# Patient Record
Sex: MALE | Race: WHITE | NOT HISPANIC OR LATINO | Employment: FULL TIME | URBAN - METROPOLITAN AREA
[De-identification: names, ages, dates, MRNs, and addresses within clinical notes are randomized per-mention and may not be internally consistent; named-entity substitution may affect disease eponyms.]

---

## 2020-06-12 ENCOUNTER — HISTORICAL (OUTPATIENT)
Dept: ADMINISTRATIVE | Facility: HOSPITAL | Age: 35
End: 2020-06-12

## 2020-06-15 LAB — FINAL CULTURE: NORMAL

## 2023-09-18 DIAGNOSIS — F41.9 ANXIETY: Primary | ICD-10-CM

## 2023-09-18 RX ORDER — SERTRALINE HYDROCHLORIDE 100 MG/1
150 TABLET, FILM COATED ORAL NIGHTLY
Qty: 90 TABLET | Refills: 0 | Status: SHIPPED | OUTPATIENT
Start: 2023-09-18 | End: 2023-10-31 | Stop reason: SDUPTHER

## 2023-09-18 RX ORDER — SERTRALINE HYDROCHLORIDE 100 MG/1
150 TABLET, FILM COATED ORAL NIGHTLY
COMMUNITY
Start: 2023-06-28 | End: 2023-09-18 | Stop reason: SDUPTHER

## 2023-10-10 DIAGNOSIS — K58.9 IRRITABLE BOWEL SYNDROME, UNSPECIFIED TYPE: Primary | ICD-10-CM

## 2023-10-10 RX ORDER — DIPHENOXYLATE HYDROCHLORIDE AND ATROPINE SULFATE 2.5; .025 MG/1; MG/1
1 TABLET ORAL 2 TIMES DAILY
COMMUNITY
End: 2023-10-10 | Stop reason: SDUPTHER

## 2023-10-11 RX ORDER — DIPHENOXYLATE HYDROCHLORIDE AND ATROPINE SULFATE 2.5; .025 MG/1; MG/1
TABLET ORAL
Qty: 30 TABLET | Refills: 0 | Status: SHIPPED | OUTPATIENT
Start: 2023-10-11

## 2023-10-11 NOTE — TELEPHONE ENCOUNTER
----- Message from Anisa Lewis sent at 10/10/2023 11:28 AM CDT -----  Regarding: MED REFILL  CAN HIS LOMITIL BE REFILLED AT [facility]

## 2023-10-30 PROBLEM — F41.9 ANXIETY: Status: ACTIVE | Noted: 2023-10-30

## 2023-10-31 ENCOUNTER — OFFICE VISIT (OUTPATIENT)
Dept: FAMILY MEDICINE | Facility: CLINIC | Age: 38
End: 2023-10-31
Payer: COMMERCIAL

## 2023-10-31 VITALS
DIASTOLIC BLOOD PRESSURE: 80 MMHG | WEIGHT: 272 LBS | RESPIRATION RATE: 16 BRPM | BODY MASS INDEX: 36.05 KG/M2 | HEIGHT: 73 IN | SYSTOLIC BLOOD PRESSURE: 130 MMHG | TEMPERATURE: 98 F | HEART RATE: 91 BPM | OXYGEN SATURATION: 97 %

## 2023-10-31 DIAGNOSIS — F32.A DEPRESSION, UNSPECIFIED DEPRESSION TYPE: ICD-10-CM

## 2023-10-31 DIAGNOSIS — E66.01 CLASS 2 SEVERE OBESITY DUE TO EXCESS CALORIES WITH SERIOUS COMORBIDITY AND BODY MASS INDEX (BMI) OF 35.0 TO 35.9 IN ADULT: ICD-10-CM

## 2023-10-31 DIAGNOSIS — F41.9 ANXIETY: ICD-10-CM

## 2023-10-31 DIAGNOSIS — E78.2 MIXED HYPERLIPIDEMIA: ICD-10-CM

## 2023-10-31 DIAGNOSIS — K58.9 IRRITABLE BOWEL SYNDROME, UNSPECIFIED TYPE: ICD-10-CM

## 2023-10-31 DIAGNOSIS — K21.9 GASTROESOPHAGEAL REFLUX DISEASE, UNSPECIFIED WHETHER ESOPHAGITIS PRESENT: ICD-10-CM

## 2023-10-31 DIAGNOSIS — E56.9 VITAMIN DEFICIENCY: ICD-10-CM

## 2023-10-31 DIAGNOSIS — I10 PRIMARY HYPERTENSION: Primary | ICD-10-CM

## 2023-10-31 DIAGNOSIS — F17.200 SMOKER: ICD-10-CM

## 2023-10-31 PROBLEM — K29.70 GASTRITIS: Status: ACTIVE | Noted: 2023-10-31

## 2023-10-31 PROBLEM — G47.00 INSOMNIA: Status: ACTIVE | Noted: 2023-10-31

## 2023-10-31 PROBLEM — K31.1 PYLORIC STENOSIS: Status: ACTIVE | Noted: 2023-10-31

## 2023-10-31 PROBLEM — G47.33 OSA (OBSTRUCTIVE SLEEP APNEA): Status: ACTIVE | Noted: 2023-10-31

## 2023-10-31 PROBLEM — F42.9 OCD (OBSESSIVE COMPULSIVE DISORDER): Status: ACTIVE | Noted: 2023-10-31

## 2023-10-31 PROBLEM — E78.5 ELEVATED LIPIDS: Status: ACTIVE | Noted: 2023-10-31

## 2023-10-31 PROBLEM — E66.9 OBESE: Status: ACTIVE | Noted: 2023-10-31

## 2023-10-31 PROBLEM — S03.00XA TMJ (DISLOCATION OF TEMPOROMANDIBULAR JOINT): Status: ACTIVE | Noted: 2023-10-31

## 2023-10-31 PROBLEM — E55.9 VITAMIN D DEFICIENCY: Status: ACTIVE | Noted: 2023-10-31

## 2023-10-31 PROCEDURE — 99214 OFFICE O/P EST MOD 30 MIN: CPT | Mod: ,,, | Performed by: FAMILY MEDICINE

## 2023-10-31 PROCEDURE — 1159F PR MEDICATION LIST DOCUMENTED IN MEDICAL RECORD: ICD-10-PCS | Mod: CPTII,,, | Performed by: FAMILY MEDICINE

## 2023-10-31 PROCEDURE — 4010F ACE/ARB THERAPY RXD/TAKEN: CPT | Mod: CPTII,,, | Performed by: FAMILY MEDICINE

## 2023-10-31 PROCEDURE — 3075F PR MOST RECENT SYSTOLIC BLOOD PRESS GE 130-139MM HG: ICD-10-PCS | Mod: CPTII,,, | Performed by: FAMILY MEDICINE

## 2023-10-31 PROCEDURE — 3079F DIAST BP 80-89 MM HG: CPT | Mod: CPTII,,, | Performed by: FAMILY MEDICINE

## 2023-10-31 PROCEDURE — 1160F RVW MEDS BY RX/DR IN RCRD: CPT | Mod: CPTII,,, | Performed by: FAMILY MEDICINE

## 2023-10-31 PROCEDURE — 3075F SYST BP GE 130 - 139MM HG: CPT | Mod: CPTII,,, | Performed by: FAMILY MEDICINE

## 2023-10-31 PROCEDURE — 3008F BODY MASS INDEX DOCD: CPT | Mod: CPTII,,, | Performed by: FAMILY MEDICINE

## 2023-10-31 PROCEDURE — 4010F PR ACE/ARB THEARPY RXD/TAKEN: ICD-10-PCS | Mod: CPTII,,, | Performed by: FAMILY MEDICINE

## 2023-10-31 PROCEDURE — 1159F MED LIST DOCD IN RCRD: CPT | Mod: CPTII,,, | Performed by: FAMILY MEDICINE

## 2023-10-31 PROCEDURE — 3079F PR MOST RECENT DIASTOLIC BLOOD PRESSURE 80-89 MM HG: ICD-10-PCS | Mod: CPTII,,, | Performed by: FAMILY MEDICINE

## 2023-10-31 PROCEDURE — 99214 PR OFFICE/OUTPT VISIT, EST, LEVL IV, 30-39 MIN: ICD-10-PCS | Mod: ,,, | Performed by: FAMILY MEDICINE

## 2023-10-31 PROCEDURE — 3008F PR BODY MASS INDEX (BMI) DOCUMENTED: ICD-10-PCS | Mod: CPTII,,, | Performed by: FAMILY MEDICINE

## 2023-10-31 PROCEDURE — 1160F PR REVIEW ALL MEDS BY PRESCRIBER/CLIN PHARMACIST DOCUMENTED: ICD-10-PCS | Mod: CPTII,,, | Performed by: FAMILY MEDICINE

## 2023-10-31 RX ORDER — CALC/MAG/B COMPLEX/D3/HERB 61
15 TABLET ORAL DAILY
COMMUNITY
End: 2023-10-31 | Stop reason: ALTCHOICE

## 2023-10-31 RX ORDER — PRAVASTATIN SODIUM 40 MG/1
40 TABLET ORAL NIGHTLY
COMMUNITY
Start: 2023-10-25

## 2023-10-31 RX ORDER — LOPERAMIDE HYDROCHLORIDE 2 MG/1
2 CAPSULE ORAL DAILY
COMMUNITY

## 2023-10-31 RX ORDER — SERTRALINE HYDROCHLORIDE 100 MG/1
150 TABLET, FILM COATED ORAL NIGHTLY
Qty: 135 TABLET | Refills: 3 | Status: SHIPPED | OUTPATIENT
Start: 2023-10-31

## 2023-10-31 RX ORDER — CHOLECALCIFEROL (VITAMIN D3) 25 MCG
1000 TABLET ORAL DAILY
COMMUNITY
End: 2023-10-31 | Stop reason: SDUPTHER

## 2023-10-31 RX ORDER — DEXLANSOPRAZOLE 60 MG/1
60 CAPSULE, DELAYED RELEASE ORAL DAILY
Qty: 90 CAPSULE | Refills: 3 | Status: SHIPPED | OUTPATIENT
Start: 2023-10-31

## 2023-10-31 RX ORDER — BENAZEPRIL HYDROCHLORIDE 10 MG/1
10 TABLET ORAL NIGHTLY
Qty: 90 TABLET | Refills: 3 | Status: SHIPPED | OUTPATIENT
Start: 2023-10-31

## 2023-10-31 RX ORDER — HYDROGEN PEROXIDE 3 %
20 SOLUTION, NON-ORAL MISCELLANEOUS DAILY
COMMUNITY
End: 2023-10-31 | Stop reason: ALTCHOICE

## 2023-10-31 RX ORDER — DILTIAZEM HYDROCHLORIDE 180 MG/1
180 CAPSULE, COATED, EXTENDED RELEASE ORAL EVERY MORNING
COMMUNITY
Start: 2023-10-27

## 2023-10-31 RX ORDER — BENAZEPRIL HYDROCHLORIDE 10 MG/1
10 TABLET ORAL NIGHTLY
COMMUNITY
Start: 2023-10-27 | End: 2023-10-31 | Stop reason: SDUPTHER

## 2023-10-31 RX ORDER — CHOLECALCIFEROL (VITAMIN D3) 25 MCG
1000 TABLET ORAL 2 TIMES DAILY
Start: 2023-10-31

## 2023-10-31 RX ORDER — CHOLESTYRAMINE 4 G/9G
4 POWDER, FOR SUSPENSION ORAL
Qty: 270 PACKET | Refills: 3 | Status: SHIPPED | OUTPATIENT
Start: 2023-10-31

## 2023-10-31 NOTE — PROGRESS NOTES
Patient Name: Buddy Hassan Jr.     Patient ID: [id number]     Chief Complaint: Results (Go over lab results.)      HPI:     Buddy Hassan Jr. is a 38 y.o. male here today for follow up for Hypertension, IBS, Depression, Anxiety, GERD, Vitamin d deficiency, and lab work results. Reviewed and discussed lab work results.    Past Medical History:   Diagnosis Date    Depression     Elevated lipids     Gastritis     GERD (gastroesophageal reflux disease)     HTN (hypertension)     Insomnia     Irritable bowel syndrome     Obese     OCD (obsessive compulsive disorder)     CATY (obstructive sleep apnea)     Pyloric stenosis     Smoker     TMJ (dislocation of temporomandibular joint)     Vitamin deficiency         History reviewed. No pertinent surgical history.     Social History     Socioeconomic History    Marital status: Single    Number of children: 1   Tobacco Use    Smoking status: Every Day     Current packs/day: 1.00     Average packs/day: 1 pack/day for 26.8 years (26.8 ttl pk-yrs)     Types: Cigarettes     Start date: 1997    Smokeless tobacco: Never   Substance and Sexual Activity    Alcohol use: Not Currently    Drug use: Never    Sexual activity: Yes     Partners: Female        Current Outpatient Medications   Medication Instructions    benazepriL (LOTENSIN) 10 mg, Oral, Nightly    cholestyramine (QUESTRAN) 4 gram packet 4 g, Oral, 3 times daily with meals    dexlansoprazole (DEXILANT) 60 mg, Oral, Daily    diltiaZEM (CARDIZEM CD) 180 mg, Oral, Every morning    diphenoxylate-atropine 2.5-0.025 mg (LOMOTIL) 2.5-0.025 mg per tablet 1 TABLET BY MOUTH BID PRN DIARRHEA    loperamide (IMODIUM) 2 mg, Oral, Daily    pravastatin (PRAVACHOL) 40 mg, Oral, Nightly    sertraline (ZOLOFT) 150 mg, Oral, Nightly    vitamin D (VITAMIN D3) 1,000 Units, Oral, 2 times daily       Review of patient's allergies indicates:   Allergen Reactions    Pravachol [pravastatin] Diarrhea          There is no immunization history on file for this  "patient.    Patient Care Team:  Farhat Wilhelm Sr., MD as PCP - General (Family Medicine)  Yulia Crockett MD as Consulting Physician (Cardiology)     Subjective:     Review of Systems    10 point review of systems conducted, negative except as stated in the history of present illness. See HPI for details.    Objective:     Visit Vitals  /80 (BP Location: Left arm, Patient Position: Sitting, BP Method: Medium (Manual))   Pulse 91   Temp 98 °F (36.7 °C)   Resp 16   Ht 6' 1" (1.854 m)   Wt 123.4 kg (272 lb)   SpO2 97%   BMI 35.89 kg/m²       Physical Exam  Constitutional:       Appearance: He is obese.   HENT:      Head: Normocephalic and atraumatic.   Cardiovascular:      Rate and Rhythm: Normal rate and regular rhythm.   Pulmonary:      Effort: Pulmonary effort is normal.      Breath sounds: Normal breath sounds.   Abdominal:      Palpations: Abdomen is soft.      Tenderness: There is no abdominal tenderness.   Musculoskeletal:         General: No swelling or tenderness. Normal range of motion.      Cervical back: Normal range of motion and neck supple.      Right lower leg: No edema.      Left lower leg: No edema.   Lymphadenopathy:      Cervical: No cervical adenopathy.   Skin:     General: Skin is warm and dry.   Neurological:      General: No focal deficit present.      Mental Status: He is alert and oriented to person, place, and time.   Psychiatric:         Mood and Affect: Mood normal.           Assessment:       ICD-10-CM ICD-9-CM   1. Primary hypertension  I10 401.9   2. Mixed hyperlipidemia  E78.2 272.2   3. Irritable bowel syndrome, unspecified type  K58.9 564.1   4. Gastroesophageal reflux disease, unspecified whether esophagitis present  K21.9 530.81   5. Depression, unspecified depression type  F32.A 311   6. Anxiety  F41.9 300.00   7. Class 2 severe obesity due to excess calories with serious comorbidity and body mass index (BMI) of 35.0 to 35.9 in adult  E66.01 278.01    Z68.35 V85.35 "   8. Vitamin D deficiency  E56.9 269.2   9. Smoker  F17.200 305.1        Plan:     1. Primary hypertension  Overview:  Continue with Benazepril 10 mg nightly.  Presently on Cardizem  mg daily as prescribed per Cardiology.  Followed by Cardiology.  Low Sodium Diet (DASH Diet - Less than 2 grams of sodium per day).  Monitor blood pressure daily and log. Report consistent numbers greater than 140/90.  Maintain healthy weight with goal BMI <30. Exercise 30 minutes per day, 5 days per week.    Assessment & Plan:  Patient's BP today is 130/80.  Patient is at goal.    Orders:  -     benazepriL (LOTENSIN) 10 MG tablet; Take 1 tablet (10 mg total) by mouth every evening.  Dispense: 90 tablet; Refill: 3    2. Mixed hyperlipidemia  Overview:  Patient presently on Pravachol 40 mg daily.  Patient is followed by Cardiology.    Stressed importance of dietary modifications. Follow a low cholesterol, low saturated fat diet with less that 200mg of cholesterol a day.  Avoid fried foods and high saturated fats (high saturated fats less than 7% of calories).  Add Flax Seed/Fish Oil supplements to diet. Increase dietary fiber.  Regular exercise can reduce LDL and raise HDL. Stressed importance of physical activity 5 times per week for 30 minutes per day.     Assessment & Plan:  Patient's last LDL was 115 on 10/24/2023.  Patient's cholesterol is not well controlled and not at goal.  He was instructed to follow-up with Cardiology as he is managing patient's cholesterol.      3. Irritable bowel syndrome, unspecified type  Overview:  Continue with Imodium 2 mg daily PRN diarrhea and occasional Lomotil 2.5-0.025 mg PRN diarrhea.    Assessment & Plan:  Patient is not well controlled.  Will start Questran Powder 4 Gram take 1 packet by mouth TID.  Instructed patient to follow up with GI for Colonoscopy.    Orders:  -     cholestyramine (QUESTRAN) 4 gram packet; Take 1 packet (4 g total) by mouth 3 (three) times daily with meals.   Dispense: 270 packet; Refill: 3    4. Gastroesophageal reflux disease, unspecified whether esophagitis present  Overview:  Avoid spicy, acidic, fried foods and alcohol.  Eat 2-3 hours before going to bed.  Avoid tight clothing, chew food thoroughly.  Reduce caffeine intake, avoid soda.    Assessment & Plan:  Patient is not well controlled.  Will start Dexilant 60 mg daily AC.    Orders:  -     dexlansoprazole (DEXILANT) 60 mg capsule; Take 1 capsule (60 mg total) by mouth once daily.  Dispense: 90 capsule; Refill: 3    5. Depression, unspecified depression type  Overview:  Continue with Zoloft 100 mg 1 1/2 tabs nightly.  Continue present med tx.  Educated patient on the risks of serotonin based medications such as serotonin modulators and SSRIs/SNRIs including common side effects of nausea, GI upset, headache dizziness as well as the rare risk for worsening symptoms of depression including development of suicidal thoughts or ideations, and serotonin syndrome.   Discussed benefits of medication not becoming noticeable until up to 6 weeks from start date.   Exercise daily. Get sunlight daily.  Practice positive phrases and repeat throughout the day, along with yoga and relaxation techniques.  Establish good social support, make changes to reduce stress.  Reports any symptoms of suicidal/homicidal ideations or self harm immediately, if clinic is closed go to nearest emergency room.    Assessment & Plan:  Patient is well controlled.    Orders:  -     sertraline (ZOLOFT) 100 MG tablet; Take 1.5 tablets (150 mg total) by mouth every evening.  Dispense: 135 tablet; Refill: 3    6. Anxiety  Overview:  Continue with Zoloft 100 mg 1 1/2 tabs nightly.  Practice deep breathing or abdominal breathing exercises when anxiety occurs.  Exercise daily. Get sunlight daily.  Avoid caffeine, alcohol and stimulants.  Practice positive phrases and repeat throughout the day, along with yoga and relaxation techniques.  Set healthy  boundaries, avoid people and conversations that increase stress.  Educated patient on the risks of serotonin based medications such as serotonin modulators and SSRIs/SNRIs including common side effects of nausea, GI upset, headache dizziness as well as the rare risk for worsening symptoms of depression including development of suicidal thoughts or ideations, and serotonin syndrome.   Discussed benefits of medication not becoming noticeable until up to 6 weeks from start date.   Reports any symptoms of suicidal or homicidal ideations immediately, if clinic is closed go to nearest emergency room.  Discussed possibility of using benzodiazepines    Assessment & Plan:  Patient is well controlled.      7. Class 2 severe obesity due to excess calories with serious comorbidity and body mass index (BMI) of 35.0 to 35.9 in adult  Overview:  Body mass index is 35.89 kg/m².  Goal BMI <30.  Exercise 5 times a week for 30 minutes per day.  Avoid soda, simple sugars, excessive rice, potatoes or bread. Limit fast foods and fried foods.  Choose complex carbs in moderation (example: green vegetables, beans, oatmeal). Eat plenty of fresh fruits and vegetables with lean meats daily.  Do not skip meals. Eat a balanced portion size.  Avoid fad diets. Consider permanent healthy life style changes.       8. Vitamin D deficiency  Overview:  Patient's present vitamin-D dose is 1000 units daily.    Assessment & Plan:  Most recent Vitamin D 24.7 ng/mL on 10/24/2023.  Will increase OTC Vitamin D3 to 1,000 units BID.  Patient is not at goal.    Orders:  -     vitamin D (VITAMIN D3) 1000 units Tab; Take 1 tablet (1,000 Units total) by mouth 2 (two) times a day.    9. Smoker  Overview:  Patient was offered smoking cessation techniques such as nicotine gum or patches.  They were also offered a more regimented smoking cessation program.  They were advised to decrease the number of cigarettes by 1 every few days until they completely stopped.  It was  strongly recommended that they set a quit date and stick to it.  And finally, important health reasons to stop smoking were given to the patient. Approximately 5 minutes was spent discussing smoking cessation.    Assessment & Plan:  Patient is smoking approximately 1 pack of cigarettes a day.          [x] Discussed lab findings with the patient.  [x] Discussed diet, exercise and if appropriate, weight loss.  [x] Instructions and information, including risks and benefits of prescribed medication(s) have been reviewed with the patient and patient verbalizes understanding. Questions have been answered to the patient's satisfaction.  [x] Appropriate counseling has been given regarding anxiety and depressive issues that were discussed today.  [] Any lab drawn today will be reviewed by physician at the time it is received and appropriate recommendations bill be made and discussed with patient.     Follow up in about 6 months (around 4/30/2024) for Follow Up.   In addition to their scheduled follow up, the patient has also been instructed to follow up on as needed basis.     Future Appointments   Date Time Provider Department Center   5/1/2024  7:20 AM [facility]   5/7/2024  1:45 PM Farhat Wilhelm Sr., MD [facility] Jb Bourgeois Sr, MD

## 2023-10-31 NOTE — ASSESSMENT & PLAN NOTE
Most recent Vitamin D 24.7 ng/mL on 10/24/2023.  Will increase OTC Vitamin D3 to 1,000 units BID.  Patient is not at goal.

## 2023-10-31 NOTE — ASSESSMENT & PLAN NOTE
Patient is not well controlled.  Will start Questran Powder 4 Gram take 1 packet by mouth TID.  Instructed patient to follow up with GI for Colonoscopy.

## 2023-10-31 NOTE — ASSESSMENT & PLAN NOTE
Patient's last LDL was 115 on 10/24/2023.  Patient's cholesterol is not well controlled and not at goal.  He was instructed to follow-up with Cardiology as he is managing patient's cholesterol.

## 2025-01-07 ENCOUNTER — TELEPHONE (OUTPATIENT)
Dept: FAMILY MEDICINE | Facility: CLINIC | Age: 40
End: 2025-01-07
Payer: COMMERCIAL

## 2025-01-10 NOTE — TELEPHONE ENCOUNTER
Called and spoke with patient he is scheduled for lab on 1/13/2025 and Result appointment on 1/16/2025. Patient verbalized an understanding.

## 2025-01-15 PROBLEM — E66.812 CLASS 2 SEVERE OBESITY DUE TO EXCESS CALORIES WITH SERIOUS COMORBIDITY AND BODY MASS INDEX (BMI) OF 35.0 TO 35.9 IN ADULT: Status: ACTIVE | Noted: 2023-10-31

## 2025-01-15 PROBLEM — E66.01 CLASS 2 SEVERE OBESITY DUE TO EXCESS CALORIES WITH SERIOUS COMORBIDITY AND BODY MASS INDEX (BMI) OF 35.0 TO 35.9 IN ADULT: Status: ACTIVE | Noted: 2023-10-31

## 2025-01-15 PROBLEM — R73.03 PREDIABETES: Status: ACTIVE | Noted: 2025-01-15

## 2025-01-16 ENCOUNTER — TELEPHONE (OUTPATIENT)
Dept: FAMILY MEDICINE | Facility: CLINIC | Age: 40
End: 2025-01-16

## 2025-01-16 ENCOUNTER — OFFICE VISIT (OUTPATIENT)
Dept: FAMILY MEDICINE | Facility: CLINIC | Age: 40
End: 2025-01-16
Payer: COMMERCIAL

## 2025-01-16 VITALS
SYSTOLIC BLOOD PRESSURE: 134 MMHG | WEIGHT: 279 LBS | RESPIRATION RATE: 20 BRPM | BODY MASS INDEX: 36.98 KG/M2 | HEART RATE: 81 BPM | TEMPERATURE: 98 F | OXYGEN SATURATION: 98 % | HEIGHT: 73 IN | DIASTOLIC BLOOD PRESSURE: 82 MMHG

## 2025-01-16 DIAGNOSIS — F41.9 ANXIETY: ICD-10-CM

## 2025-01-16 DIAGNOSIS — F42.2 MIXED OBSESSIONAL THOUGHTS AND ACTS: ICD-10-CM

## 2025-01-16 DIAGNOSIS — E66.812 CLASS 2 SEVERE OBESITY DUE TO EXCESS CALORIES WITH SERIOUS COMORBIDITY AND BODY MASS INDEX (BMI) OF 35.0 TO 35.9 IN ADULT: ICD-10-CM

## 2025-01-16 DIAGNOSIS — E78.2 MIXED HYPERLIPIDEMIA: ICD-10-CM

## 2025-01-16 DIAGNOSIS — I10 PRIMARY HYPERTENSION: ICD-10-CM

## 2025-01-16 DIAGNOSIS — R73.03 PREDIABETES: ICD-10-CM

## 2025-01-16 DIAGNOSIS — F32.A DEPRESSION, UNSPECIFIED DEPRESSION TYPE: ICD-10-CM

## 2025-01-16 DIAGNOSIS — E55.9 VITAMIN D DEFICIENCY: Primary | ICD-10-CM

## 2025-01-16 DIAGNOSIS — K58.0 IRRITABLE BOWEL SYNDROME WITH DIARRHEA: ICD-10-CM

## 2025-01-16 DIAGNOSIS — Z53.20 COLONOSCOPY REFUSED: ICD-10-CM

## 2025-01-16 DIAGNOSIS — E66.01 CLASS 2 SEVERE OBESITY DUE TO EXCESS CALORIES WITH SERIOUS COMORBIDITY AND BODY MASS INDEX (BMI) OF 35.0 TO 35.9 IN ADULT: ICD-10-CM

## 2025-01-16 PROCEDURE — 99214 OFFICE O/P EST MOD 30 MIN: CPT | Mod: ,,,

## 2025-01-16 PROCEDURE — 1160F RVW MEDS BY RX/DR IN RCRD: CPT | Mod: CPTII,,,

## 2025-01-16 PROCEDURE — 3008F BODY MASS INDEX DOCD: CPT | Mod: CPTII,,,

## 2025-01-16 PROCEDURE — 3044F HG A1C LEVEL LT 7.0%: CPT | Mod: CPTII,,,

## 2025-01-16 PROCEDURE — 4010F ACE/ARB THERAPY RXD/TAKEN: CPT | Mod: CPTII,,,

## 2025-01-16 PROCEDURE — 1159F MED LIST DOCD IN RCRD: CPT | Mod: CPTII,,,

## 2025-01-16 PROCEDURE — 3079F DIAST BP 80-89 MM HG: CPT | Mod: CPTII,,,

## 2025-01-16 PROCEDURE — 3075F SYST BP GE 130 - 139MM HG: CPT | Mod: CPTII,,,

## 2025-01-16 RX ORDER — CALC/MAG/B COMPLEX/D3/HERB 61
15 TABLET ORAL DAILY
COMMUNITY

## 2025-01-16 RX ORDER — EZETIMIBE 10 MG/1
10 TABLET ORAL DAILY
COMMUNITY

## 2025-01-16 RX ORDER — SERTRALINE HYDROCHLORIDE 100 MG/1
150 TABLET, FILM COATED ORAL NIGHTLY
Qty: 135 TABLET | Refills: 1 | Status: SHIPPED | OUTPATIENT
Start: 2025-01-16 | End: 2025-07-15

## 2025-01-16 RX ORDER — ESOMEPRAZOLE MAGNESIUM 20 MG/1
20 GRANULE, DELAYED RELEASE ORAL
COMMUNITY

## 2025-01-16 NOTE — ASSESSMENT & PLAN NOTE
LDL 85  today; at goal of < 100.    Continue Pravachol 40 mg and Zetia 10 mg daily.  Continue following Cardiology.  Repeat in 6 months.

## 2025-01-16 NOTE — ASSESSMENT & PLAN NOTE
"Reports only taking two Imodium 2 mg daily; and taking Lomotil 2.5-0.025 mg PRN for diarrhea.    Pt reports he self-discontinued Questran Powder 4 grams b/c he "just did not want to take more medications".    Pt reports not following up with GI for colonoscopy, since he feels every time he has a colonoscopy, the prep for the procedure adversely affects his GI tract, and it takes 4-6 mos to restore his normal BMs. He reports having tried several different preps for each of his colonoscopies, which he estimates as being 3 or 4 in number.    Reports last colonoscopy was approx 10 yrs ago; reports last colonoscopy was abnormal due to polyp findings, and was instructed to return in 3 years, but has failed to do so, because he is unwilling to undergo these procedures due to his disruption to his normal Bms.  "

## 2025-01-16 NOTE — ASSESSMENT & PLAN NOTE
Vitamin D 30.2 ng/mL; not at goal according to [employer] guidelines of Vit D being 40 - 60.    Pt report having suffering with IBS, and reports taking 1000 Vit D3 once a day, since taking BID causes diarrhea IBS symptoms to worsen    Continue taking 1000 Vit D3 once a day.     Repeat Vit D 3 mos.

## 2025-01-16 NOTE — PROGRESS NOTES
Patient ID: [id number]     Chief Complaint: Follow-up (Lab results ) and Medication Refill    HPI:     Buddy Hassan Jr. is a 39 y.o. male here today for lab results and refill of his Zoloft. Labs reviewed. No other complaints today.     Past Medical History:   Diagnosis Date    Depression     Elevated lipids     Gastritis     GERD (gastroesophageal reflux disease)     HTN (hypertension)     Insomnia     Irritable bowel syndrome     Obese     OCD (obsessive compulsive disorder)     CATY (obstructive sleep apnea)     Pyloric stenosis     Smoker     TMJ (dislocation of temporomandibular joint)     Vitamin deficiency         History reviewed. No pertinent surgical history.     Social History     Socioeconomic History    Marital status: Single    Number of children: 1   Tobacco Use    Smoking status: Every Day     Current packs/day: 1.00     Average packs/day: 1 pack/day for 28.0 years (28.0 ttl pk-yrs)     Types: Cigarettes     Start date: 1997    Smokeless tobacco: Never   Substance and Sexual Activity    Alcohol use: Not Currently    Drug use: Never    Sexual activity: Yes     Partners: Female     Social Drivers of Health     Financial Resource Strain: Low Risk  (1/12/2025)    Overall Financial Resource Strain (CARDIA)     Difficulty of Paying Living Expenses: Not very hard   Food Insecurity: No Food Insecurity (1/12/2025)    Hunger Vital Sign     Worried About Running Out of Food in the Last Year: Never true     Ran Out of Food in the Last Year: Never true   Transportation Needs: No Transportation Needs (1/16/2025)    TRANSPORTATION NEEDS     Transportation : No   Physical Activity: Inactive (1/12/2025)    Exercise Vital Sign     Days of Exercise per Week: 0 days     Minutes of Exercise per Session: 0 min   Stress: Stress Concern Present (1/12/2025)    [employer] [place] [employer] - Occupational Stress Questionnaire     Feeling of Stress : To some extent   Housing Stability: Low Risk  (1/16/2025)    Housing  "Stability Vital Sign     Unable to Pay for Housing in the Last Year: No     Homeless in the Last Year: No        Current Outpatient Medications   Medication Instructions    benazepriL (LOTENSIN) 10 mg, Oral, Nightly    cholestyramine (QUESTRAN) 4 gram packet 4 g, Oral, 3 times daily with meals    dexlansoprazole (DEXILANT) 60 mg, Oral, Daily    diltiaZEM (CARDIZEM CD) 180 mg, Every morning    diphenoxylate-atropine 2.5-0.025 mg (LOMOTIL) 2.5-0.025 mg per tablet 1 TABLET BY MOUTH BID PRN DIARRHEA    esomeprazole (NEXIUM) 20 mg, Before breakfast    ezetimibe (ZETIA) 10 mg, Daily    lansoprazole (PREVACID) 15 mg, Daily    loperamide (IMODIUM) 2 mg, Daily    pravastatin (PRAVACHOL) 40 mg, Nightly    sertraline (ZOLOFT) 150 mg, Oral, Nightly    vitamin D (VITAMIN D3) 1,000 Units, Oral, 2 times daily       Review of patient's allergies indicates:   Allergen Reactions    Pravachol [pravastatin] Diarrhea        Patient Care Team:  Farhat Wilhelm Sr., MD as PCP - General (Family Medicine)  Yluia Crockett MD as Consulting Physician (Cardiology)     Subjective:     12 point review of systems conducted, negative except as stated in the history of present illness. See HPI for details.    Objective:     Visit Vitals  /82 (BP Location: Left arm, Patient Position: Sitting)   Pulse 81   Temp 98.4 °F (36.9 °C)   Resp 20   Ht 6' 1" (1.854 m)   Wt 126.6 kg (279 lb)   SpO2 98%   BMI 36.81 kg/m²     Physical Exam  Vitals and nursing note reviewed.   Constitutional:       General: He is not in acute distress.     Appearance: He is obese. He is not ill-appearing.   HENT:      Head: Normocephalic and atraumatic.      Mouth/Throat:      Mouth: Mucous membranes are moist.      Pharynx: Oropharynx is clear.   Eyes:      General: No scleral icterus.     Extraocular Movements: Extraocular movements intact.      Conjunctiva/sclera: Conjunctivae normal.      Pupils: Pupils are equal, round, and reactive to light.   Neck:      " Vascular: No carotid bruit.   Cardiovascular:      Rate and Rhythm: Normal rate and regular rhythm.      Heart sounds: No murmur heard.     No friction rub. No gallop.   Pulmonary:      Effort: Pulmonary effort is normal. No respiratory distress.      Breath sounds: Normal breath sounds. No wheezing, rhonchi or rales.   Musculoskeletal:         General: Normal range of motion.      Cervical back: Normal range of motion and neck supple.   Skin:     General: Skin is warm and dry.   Neurological:      General: No focal deficit present.      Mental Status: He is alert.   Psychiatric:         Mood and Affect: Mood normal.       Labs Reviewed:     Chemistry:  Lab Results   Component Value Date     01/13/2025    K 4.3 01/13/2025    BUN 17 01/13/2025    CREATININE 0.98 01/13/2025    EGFRNORACEVR 99 01/13/2025    CALCIUM 9.6 01/13/2025    ALKPHOS 75 11/11/2004    ALBUMIN 4.5 01/13/2025    BILIDIR <0.20 10/24/2023    AST 24 01/13/2025    ALT 40 01/13/2025    [id number] 30.2 01/13/2025    TSH 1.490 01/13/2025        Lab Results   Component Value Date    HGBA1C 5.8 (H) 01/13/2025        Hematology:  Lab Results   Component Value Date    WBC 7.8 01/13/2025    HGB 14.7 01/13/2025    HCT 44.7 01/13/2025     01/13/2025       Lipid Panel:  Lab Results   Component Value Date    CHOL 140 01/13/2025    HDL 38 (L) 01/13/2025    TRIG 87 01/13/2025     Assessment:       ICD-10-CM ICD-9-CM   1. Vitamin D deficiency  E55.9 268.9   2. Class 2 severe obesity due to excess calories with serious comorbidity and body mass index (BMI) of 35.0 to 35.9 in adult  E66.812 278.01    E66.01 V85.35    Z68.35    3. Prediabetes  R73.03 790.29   4. Mixed hyperlipidemia  E78.2 272.2   5. Depression, unspecified depression type  F32.A 311   6. Primary hypertension  I10 401.9   7. Irritable bowel syndrome with diarrhea  K58.0 564.1   8. Mixed obsessional thoughts and acts  F42.2 300.3   9. Colonoscopy refused  Z53.20 V64.2   10. Anxiety  F41.9  300.00     Plan:   1. Vitamin D deficiency  Overview:  Patient's present vitamin-D dose is 1000 units daily.  10/2023 - increased 1000 IU Vit D3 dosing from daily to BID.    Assessment & Plan:  Vitamin D 30.2 ng/mL; not at goal according to Endocrine Society guidelines of Vit D being 40 - 60.    Pt report having suffering with IBS, and reports taking 1000 Vit D3 once a day, since taking BID causes diarrhea IBS symptoms to worsen    Continue taking 1000 Vit D3 once a day.     Repeat Vit D 3 mos.    Orders:  -     Vitamin D; Future; Expected date: 07/16/2025  -     Comprehensive Metabolic Panel; Future; Expected date: 07/16/2025    2. Class 2 severe obesity due to excess calories with serious comorbidity and body mass index (BMI) of 35.0 to 35.9 in adult  Overview:  Body mass index is 35.89 kg/m².  Goal BMI <30.  Exercise 5 times a week for 30 minutes per day.  Avoid soda, simple sugars, excessive rice, potatoes or bread. Limit fast foods and fried foods.  Choose complex carbs in moderation (example: green vegetables, beans, oatmeal). Eat plenty of fresh fruits and vegetables with lean meats daily.  Do not skip meals. Eat a balanced portion size.  Avoid fad diets. Consider permanent healthy life style changes.     Assessment & Plan:  Discussed referral to [facility].  Pt declined; prefers to work on diet and exercise.  Will continue to monitor.      3. Prediabetes  Overview:  Background:  A1c: 5.8 today.  No previous prediabetes or diabetes diagnoses.  No current or previous antihyperglycemic medications.    Assessment & Plan:  Plan:  Discussed options: Lifestyle mods +/- metformin  Declined metformin  Repeat A1c in 6 months    Recommend lifestyle modifications:   ? Diet:   - Adopt a mediterranean diet, with an emphasis on vegetables, fruits (limited tropical fruits), fish, poultry, non-tropical vegetable oils, nuts; minimize red meat.  - Significantly reduce consumption of bread, pasta, rice, potatoes,  sweets, and sugary drinks  - Reduce saturated fat; eliminate fried foods and trans fats; choose grilled over fried food  - Reduce ultra processed food.  If it comes in a box or wrapper it is likely processed.  - Limit your portion sizes  ? Exercise:  - Regular aerobic activity (at least 150 minutes/week of moderate-intensity activity, 75 minutes/week of vigorous intensity activity, or an equivalent combination of both)  - Resistance exercises (weights, bands, body-weight) at least two times per week    Orders:  -     Hemoglobin A1C; Future; Expected date: 07/16/2025  -     Comprehensive Metabolic Panel; Future; Expected date: 07/16/2025    4. Mixed hyperlipidemia  Overview:  Patient presently on Pravachol 40 mg daily.  Patient is followed by Cardiology.    Stressed importance of dietary modifications. Follow a low cholesterol, low saturated fat diet with less that 200mg of cholesterol a day.  Avoid fried foods and high saturated fats (high saturated fats less than 7% of calories).  Add Flax Seed/Fish Oil supplements to diet. Increase dietary fiber.  Regular exercise can reduce LDL and raise HDL. Stressed importance of physical activity 5 times per week for 30 minutes per day.     Assessment & Plan:  LDL 85  today; at goal of < 100.    Continue Pravachol 40 mg and Zetia 10 mg daily.  Continue following Cardiology.  Repeat in 6 months.     Orders:  -     Lipid Panel; Future; Expected date: 07/16/2025    5. Depression, unspecified depression type  Overview:  Continue with Zoloft 100 mg 1 1/2 tabs nightly.  Continue present med tx.  Educated patient on the risks of serotonin based medications such as serotonin modulators and SSRIs/SNRIs including common side effects of nausea, GI upset, headache dizziness as well as the rare risk for worsening symptoms of depression including development of suicidal thoughts or ideations, and serotonin syndrome.   Discussed benefits of medication not becoming noticeable until up to 6  "weeks from start date.   Exercise daily. Get sunlight daily.  Practice positive phrases and repeat throughout the day, along with yoga and relaxation techniques.  Establish good social support, make changes to reduce stress.  Reports any symptoms of suicidal/homicidal ideations or self harm immediately, if clinic is closed go to nearest emergency room.    Assessment & Plan:  Patient is well controlled.  Denies SI/HI.  Continue with Zoloft 100 mg 1 1/2 tabs nightly.    Orders:  -     sertraline (ZOLOFT) 100 MG tablet; Take 1.5 tablets (150 mg total) by mouth every evening.  Dispense: 135 tablet; Refill: 1    6. Primary hypertension  Overview:  Continue with Benazepril 10 mg nightly.  Presently on Cardizem  mg daily as prescribed per Cardiology.  Followed by Cardiology.  Low Sodium Diet (DASH Diet - Less than 2 grams of sodium per day).  Monitor blood pressure daily and log. Report consistent numbers greater than 140/90.  Maintain healthy weight with goal BMI <30. Exercise 30 minutes per day, 5 days per week.    Assessment & Plan:  Well controlled; BP today is 134/82.  Continue current medications.       7. Irritable bowel syndrome with diarrhea  Overview:  Continue with Imodium 2 mg daily PRN diarrhea and occasional Lomotil 2.5-0.025 mg PRN diarrhea.    Assessment & Plan:  Reports only taking two Imodium 2 mg daily; and taking Lomotil 2.5-0.025 mg PRN for diarrhea.    Pt reports he self-discontinued Questran Powder 4 grams b/c he "just did not want to take more medications".    Pt reports not following up with GI for colonoscopy, since he feels every time he has a colonoscopy, the prep for the procedure adversely affects his GI tract, and it takes 4-6 mos to restore his normal BMs. He reports having tried several different preps for each of his colonoscopies, which he estimates as being 3 or 4 in number.    Reports last colonoscopy was approx 10 yrs ago; reports last colonoscopy was abnormal due to polyp " findings, and was instructed to return in 3 years, but has failed to do so, because he is unwilling to undergo these procedures due to his disruption to his normal Bms.      8. Mixed obsessional thoughts and acts  Overview:  Continue with Zoloft 100 mg 1 1/2 tabs nightly.  Practice deep breathing or abdominal breathing exercises when anxiety occurs.  Exercise daily. Get sunlight daily.  Avoid caffeine, alcohol and stimulants.  Practice positive phrases and repeat throughout the day, along with yoga and relaxation techniques.  Set healthy boundaries, avoid people and conversations that increase stress.  Educated patient on the risks of serotonin based medications such as serotonin modulators and SSRIs/SNRIs including common side effects of nausea, GI upset, headache dizziness as well as the rare risk for worsening symptoms of depression including development of suicidal thoughts or ideations, and serotonin syndrome.   Discussed benefits of medication not becoming noticeable until up to 6 weeks from start date.   Reports any symptoms of suicidal or homicidal ideations immediately, if clinic is closed go to nearest emergency room.  Discussed possibility of using benzodiazepines    Assessment & Plan:  Patient is well controlled.  Denies SI/HI.  Continue with Zoloft 100 mg 1 1/2 tabs nightly.      9. Colonoscopy refused  Assessment & Plan:  I have recommended that this patient have a Colonoscopy given the patient's chronic history of IBS but he declines at this time. I have discussed the risks and benefits of this examination with him. The patient verbalizes understanding.        10. Anxiety  Overview:  Continue with Zoloft 100 mg 1 1/2 tabs nightly.  Practice deep breathing or abdominal breathing exercises when anxiety occurs.  Exercise daily. Get sunlight daily.  Avoid caffeine, alcohol and stimulants.  Practice positive phrases and repeat throughout the day, along with yoga and relaxation techniques.  Set healthy  boundaries, avoid people and conversations that increase stress.  Educated patient on the risks of serotonin based medications such as serotonin modulators and SSRIs/SNRIs including common side effects of nausea, GI upset, headache dizziness as well as the rare risk for worsening symptoms of depression including development of suicidal thoughts or ideations, and serotonin syndrome.   Discussed benefits of medication not becoming noticeable until up to 6 weeks from start date.   Reports any symptoms of suicidal or homicidal ideations immediately, if clinic is closed go to nearest emergency room.  Discussed possibility of using benzodiazepines    Assessment & Plan:  Patient is well controlled.  Denies SI/HI.  Continue with Zoloft 100 mg 1 1/2 tabs nightly.        Follow up in about 6 months (around 7/16/2025) for Wellness. In addition to their scheduled follow up, the patient has also been instructed to follow up on as needed basis.     Amanda Haq NP

## 2025-01-16 NOTE — ASSESSMENT & PLAN NOTE
I have recommended that this patient have a Colonoscopy given the patient's chronic history of IBS but he declines at this time. I have discussed the risks and benefits of this examination with him. The patient verbalizes understanding.

## 2025-01-16 NOTE — ASSESSMENT & PLAN NOTE
Plan:  Discussed options: Lifestyle mods +/- metformin  Declined metformin  Repeat A1c in 6 months    Recommend lifestyle modifications:   ? Diet:   - Adopt a mediterranean diet, with an emphasis on vegetables, fruits (limited tropical fruits), fish, poultry, non-tropical vegetable oils, nuts; minimize red meat.  - Significantly reduce consumption of bread, pasta, rice, potatoes, sweets, and sugary drinks  - Reduce saturated fat; eliminate fried foods and trans fats; choose grilled over fried food  - Reduce ultra processed food.  If it comes in a box or wrapper it is likely processed.  - Limit your portion sizes  ? Exercise:  - Regular aerobic activity (at least 150 minutes/week of moderate-intensity activity, 75 minutes/week of vigorous intensity activity, or an equivalent combination of both)  - Resistance exercises (weights, bands, body-weight) at least two times per week

## 2025-01-16 NOTE — ASSESSMENT & PLAN NOTE
Discussed referral to [facility].  Pt declined; prefers to work on diet and exercise.  Will continue to monitor.

## 2025-01-16 NOTE — TELEPHONE ENCOUNTER
----- Message from [place] Yu[place] MD sent at 1/14/2025  3:25 PM CST -----  No acute lab concerns.  We will discuss all results at the upcoming appointment.